# Patient Record
Sex: MALE | Race: OTHER | HISPANIC OR LATINO | URBAN - METROPOLITAN AREA
[De-identification: names, ages, dates, MRNs, and addresses within clinical notes are randomized per-mention and may not be internally consistent; named-entity substitution may affect disease eponyms.]

---

## 2018-08-09 ENCOUNTER — EMERGENCY (EMERGENCY)
Facility: HOSPITAL | Age: 28
LOS: 1 days | Discharge: HOME | End: 2018-08-09
Attending: EMERGENCY MEDICINE | Admitting: EMERGENCY MEDICINE
Payer: SELF-PAY

## 2018-08-09 VITALS
OXYGEN SATURATION: 98 % | HEART RATE: 107 BPM | TEMPERATURE: 100 F | RESPIRATION RATE: 16 BRPM | SYSTOLIC BLOOD PRESSURE: 132 MMHG | DIASTOLIC BLOOD PRESSURE: 92 MMHG

## 2018-08-09 DIAGNOSIS — E66.01 MORBID (SEVERE) OBESITY DUE TO EXCESS CALORIES: ICD-10-CM

## 2018-08-09 DIAGNOSIS — E11.9 TYPE 2 DIABETES MELLITUS WITHOUT COMPLICATIONS: ICD-10-CM

## 2018-08-09 LAB
ALBUMIN SERPL ELPH-MCNC: 4.3 G/DL — SIGNIFICANT CHANGE UP (ref 3.3–5)
ALP SERPL-CCNC: 116 U/L — SIGNIFICANT CHANGE UP (ref 40–120)
ALT FLD-CCNC: 89 U/L — HIGH (ref 4–41)
AST SERPL-CCNC: 76 U/L — HIGH (ref 4–40)
BASE EXCESS BLDV CALC-SCNC: 0.9 MMOL/L — SIGNIFICANT CHANGE UP
BASOPHILS # BLD AUTO: 0.09 K/UL — SIGNIFICANT CHANGE UP (ref 0–0.2)
BASOPHILS NFR BLD AUTO: 1.4 % — SIGNIFICANT CHANGE UP (ref 0–2)
BILIRUB SERPL-MCNC: 0.4 MG/DL — SIGNIFICANT CHANGE UP (ref 0.2–1.2)
BLOOD GAS VENOUS - CREATININE: 0.91 MG/DL — SIGNIFICANT CHANGE UP (ref 0.5–1.3)
BUN SERPL-MCNC: 9 MG/DL — SIGNIFICANT CHANGE UP (ref 7–23)
CALCIUM SERPL-MCNC: 9.3 MG/DL — SIGNIFICANT CHANGE UP (ref 8.4–10.5)
CHLORIDE BLDV-SCNC: 97 MMOL/L — SIGNIFICANT CHANGE UP (ref 96–108)
CHLORIDE SERPL-SCNC: 91 MMOL/L — LOW (ref 98–107)
CO2 SERPL-SCNC: 20 MMOL/L — LOW (ref 22–31)
CREAT SERPL-MCNC: 0.83 MG/DL — SIGNIFICANT CHANGE UP (ref 0.5–1.3)
EOSINOPHIL # BLD AUTO: 0.08 K/UL — SIGNIFICANT CHANGE UP (ref 0–0.5)
EOSINOPHIL NFR BLD AUTO: 1.2 % — SIGNIFICANT CHANGE UP (ref 0–6)
GAS PNL BLDV: 127 MMOL/L — LOW (ref 136–146)
GLUCOSE BLDV-MCNC: 379 — HIGH (ref 70–99)
GLUCOSE SERPL-MCNC: 349 MG/DL — HIGH (ref 70–99)
HBA1C BLD-MCNC: 12.8 % — HIGH (ref 4–5.6)
HCO3 BLDV-SCNC: 23 MMOL/L — SIGNIFICANT CHANGE UP (ref 20–27)
HCT VFR BLD CALC: 46.7 % — SIGNIFICANT CHANGE UP (ref 39–50)
HCT VFR BLDV CALC: 49.8 % — SIGNIFICANT CHANGE UP (ref 39–51)
HGB BLD-MCNC: 15.4 G/DL — SIGNIFICANT CHANGE UP (ref 13–17)
HGB BLDV-MCNC: 16.3 G/DL — SIGNIFICANT CHANGE UP (ref 13–17)
IMM GRANULOCYTES # BLD AUTO: 0.02 # — SIGNIFICANT CHANGE UP
IMM GRANULOCYTES NFR BLD AUTO: 0.3 % — SIGNIFICANT CHANGE UP (ref 0–1.5)
LACTATE BLDV-MCNC: 1.5 MMOL/L — SIGNIFICANT CHANGE UP (ref 0.5–2)
LIDOCAIN IGE QN: 23 U/L — SIGNIFICANT CHANGE UP (ref 7–60)
LYMPHOCYTES # BLD AUTO: 0.76 K/UL — LOW (ref 1–3.3)
LYMPHOCYTES # BLD AUTO: 11.5 % — LOW (ref 13–44)
MCHC RBC-ENTMCNC: 28.4 PG — SIGNIFICANT CHANGE UP (ref 27–34)
MCHC RBC-ENTMCNC: 33 % — SIGNIFICANT CHANGE UP (ref 32–36)
MCV RBC AUTO: 86.2 FL — SIGNIFICANT CHANGE UP (ref 80–100)
MONOCYTES # BLD AUTO: 0.94 K/UL — HIGH (ref 0–0.9)
MONOCYTES NFR BLD AUTO: 14.2 % — HIGH (ref 2–14)
NEUTROPHILS # BLD AUTO: 4.72 K/UL — SIGNIFICANT CHANGE UP (ref 1.8–7.4)
NEUTROPHILS NFR BLD AUTO: 71.4 % — SIGNIFICANT CHANGE UP (ref 43–77)
NRBC # FLD: 0 — SIGNIFICANT CHANGE UP
PCO2 BLDV: 46 MMHG — SIGNIFICANT CHANGE UP (ref 41–51)
PH BLDV: 7.37 PH — SIGNIFICANT CHANGE UP (ref 7.32–7.43)
PLATELET # BLD AUTO: 256 K/UL — SIGNIFICANT CHANGE UP (ref 150–400)
PMV BLD: 10 FL — SIGNIFICANT CHANGE UP (ref 7–13)
PO2 BLDV: 25 MMHG — LOW (ref 35–40)
POTASSIUM BLDV-SCNC: 4.2 MMOL/L — SIGNIFICANT CHANGE UP (ref 3.4–4.5)
POTASSIUM SERPL-MCNC: 4.2 MMOL/L — SIGNIFICANT CHANGE UP (ref 3.5–5.3)
POTASSIUM SERPL-SCNC: 4.2 MMOL/L — SIGNIFICANT CHANGE UP (ref 3.5–5.3)
PROT SERPL-MCNC: 7.7 G/DL — SIGNIFICANT CHANGE UP (ref 6–8.3)
RBC # BLD: 5.42 M/UL — SIGNIFICANT CHANGE UP (ref 4.2–5.8)
RBC # FLD: 12.9 % — SIGNIFICANT CHANGE UP (ref 10.3–14.5)
SAO2 % BLDV: 44.3 % — LOW (ref 60–85)
SODIUM SERPL-SCNC: 128 MMOL/L — LOW (ref 135–145)
TROPONIN T, HIGH SENSITIVITY: 7 NG/L — SIGNIFICANT CHANGE UP (ref ?–14)
TROPONIN T, HIGH SENSITIVITY: 8 NG/L — SIGNIFICANT CHANGE UP (ref ?–14)
WBC # BLD: 6.61 K/UL — SIGNIFICANT CHANGE UP (ref 3.8–10.5)
WBC # FLD AUTO: 6.61 K/UL — SIGNIFICANT CHANGE UP (ref 3.8–10.5)

## 2018-08-09 PROCEDURE — 71046 X-RAY EXAM CHEST 2 VIEWS: CPT | Mod: 26

## 2018-08-09 PROCEDURE — 93306 TTE W/DOPPLER COMPLETE: CPT | Mod: 26

## 2018-08-09 PROCEDURE — 99218: CPT | Mod: 25

## 2018-08-09 PROCEDURE — 99245 OFF/OP CONSLTJ NEW/EST HI 55: CPT

## 2018-08-09 RX ORDER — INSULIN LISPRO 100/ML
VIAL (ML) SUBCUTANEOUS
Qty: 0 | Refills: 0 | Status: DISCONTINUED | OUTPATIENT
Start: 2018-08-09 | End: 2018-08-13

## 2018-08-09 RX ORDER — DEXTROSE 50 % IN WATER 50 %
25 SYRINGE (ML) INTRAVENOUS ONCE
Qty: 0 | Refills: 0 | Status: DISCONTINUED | OUTPATIENT
Start: 2018-08-09 | End: 2018-08-13

## 2018-08-09 RX ORDER — DEXTROSE 50 % IN WATER 50 %
12.5 SYRINGE (ML) INTRAVENOUS ONCE
Qty: 0 | Refills: 0 | Status: DISCONTINUED | OUTPATIENT
Start: 2018-08-09 | End: 2018-08-13

## 2018-08-09 RX ORDER — KETOROLAC TROMETHAMINE 30 MG/ML
15 SYRINGE (ML) INJECTION ONCE
Qty: 0 | Refills: 0 | Status: DISCONTINUED | OUTPATIENT
Start: 2018-08-09 | End: 2018-08-09

## 2018-08-09 RX ORDER — ACETAMINOPHEN 500 MG
650 TABLET ORAL ONCE
Qty: 0 | Refills: 0 | Status: COMPLETED | OUTPATIENT
Start: 2018-08-09 | End: 2018-08-09

## 2018-08-09 RX ORDER — SODIUM CHLORIDE 9 MG/ML
1000 INJECTION, SOLUTION INTRAVENOUS
Qty: 0 | Refills: 0 | Status: DISCONTINUED | OUTPATIENT
Start: 2018-08-09 | End: 2018-08-13

## 2018-08-09 RX ORDER — SODIUM CHLORIDE 9 MG/ML
2000 INJECTION INTRAMUSCULAR; INTRAVENOUS; SUBCUTANEOUS ONCE
Qty: 0 | Refills: 0 | Status: COMPLETED | OUTPATIENT
Start: 2018-08-09 | End: 2018-08-09

## 2018-08-09 RX ORDER — GLUCAGON INJECTION, SOLUTION 0.5 MG/.1ML
1 INJECTION, SOLUTION SUBCUTANEOUS ONCE
Qty: 0 | Refills: 0 | Status: DISCONTINUED | OUTPATIENT
Start: 2018-08-09 | End: 2018-08-13

## 2018-08-09 RX ORDER — INSULIN GLARGINE 100 [IU]/ML
20 INJECTION, SOLUTION SUBCUTANEOUS AT BEDTIME
Qty: 0 | Refills: 0 | Status: DISCONTINUED | OUTPATIENT
Start: 2018-08-09 | End: 2018-08-10

## 2018-08-09 RX ORDER — INSULIN LISPRO 100/ML
7 VIAL (ML) SUBCUTANEOUS
Qty: 0 | Refills: 0 | Status: DISCONTINUED | OUTPATIENT
Start: 2018-08-09 | End: 2018-08-10

## 2018-08-09 RX ORDER — DEXTROSE 50 % IN WATER 50 %
15 SYRINGE (ML) INTRAVENOUS ONCE
Qty: 0 | Refills: 0 | Status: DISCONTINUED | OUTPATIENT
Start: 2018-08-09 | End: 2018-08-13

## 2018-08-09 RX ORDER — FAMOTIDINE 10 MG/ML
20 INJECTION INTRAVENOUS ONCE
Qty: 0 | Refills: 0 | Status: COMPLETED | OUTPATIENT
Start: 2018-08-09 | End: 2018-08-09

## 2018-08-09 RX ADMIN — Medication 15 MILLIGRAM(S): at 11:35

## 2018-08-09 RX ADMIN — INSULIN GLARGINE 20 UNIT(S): 100 INJECTION, SOLUTION SUBCUTANEOUS at 22:44

## 2018-08-09 RX ADMIN — SODIUM CHLORIDE 2000 MILLILITER(S): 9 INJECTION INTRAMUSCULAR; INTRAVENOUS; SUBCUTANEOUS at 10:30

## 2018-08-09 RX ADMIN — Medication 15 MILLIGRAM(S): at 12:32

## 2018-08-09 RX ADMIN — SODIUM CHLORIDE 2000 MILLILITER(S): 9 INJECTION INTRAMUSCULAR; INTRAVENOUS; SUBCUTANEOUS at 12:30

## 2018-08-09 RX ADMIN — FAMOTIDINE 20 MILLIGRAM(S): 10 INJECTION INTRAVENOUS at 10:30

## 2018-08-09 RX ADMIN — Medication 30 MILLILITER(S): at 10:30

## 2018-08-09 RX ADMIN — Medication 5: at 18:49

## 2018-08-09 RX ADMIN — Medication 650 MILLIGRAM(S): at 10:30

## 2018-08-09 RX ADMIN — Medication 7 UNIT(S): at 18:47

## 2018-08-09 NOTE — ED CDU PROVIDER INITIAL DAY NOTE - ATTENDING CONTRIBUTION TO CARE
19M with 1d of myalgias and fever also with burning chest pain, intermittent, no SOB.  In ED EKG w [place] depressions, no ST changes. Labs with hyperglycemia, no prior h/o DM.  Serial trop negative. CXR clear. Bedside u/s negative for effusion. Symptoms improved with IVF and NSAIDS. On exam nad, well appearing, mmm, lungs clear, rrr, abd soft, 2+ pulses, no edema, no rash, no focal neuro deficits.  Placed in CDU for new DM. Likely viral syndrome, consider mild pericarditis, will continue nsaids.

## 2018-08-09 NOTE — CONSULT NOTE ADULT - ASSESSMENT
29 yo M with history of well controlled asthma, presenting with generalized pain, found to have newly diagnosed uncontrolled Type 2 DM.

## 2018-08-09 NOTE — ED ADULT NURSE NOTE - OBJECTIVE STATEMENT
received pt A&Ox3 in no apparent distress at this time. #20g IVL to L AC, bloods drawn and sent to the lab. no s/s of infiltration noted at this time. family and MD at bedside. vss. cardiac monitor in place. medicated as per MD order. dispo pending.

## 2018-08-09 NOTE — CONSULT NOTE ADULT - PROBLEM SELECTOR RECOMMENDATION 9
Newly diagnosed type 2 DM, uncontrolled due to poor, extremely high carb diet as well as lack of any medical care for a long time.  Discussed with patient that he will need to do once daily insulin with a oral regimen which he is in agreement with.   -Recommend to start Lantus 20 units at bedtime  -Recommend to start Humalog 7 units tid with meals  -Recommend to start Humalog LDSS tid with meals and at bedtime  -On discharge, recommend to start basal insulin, does to be determined ( likely between 20-25 units daily), can prescribe either Lantus, Basaglar, Levemir, Toujeo or Trseiba.   -On discharge, start Metformin 500mg BID and up-titrate to 1000mg as tolerated.   -Please prescribe insulin pen, pen needles, glucometer, glucose testing strips, Lancets, and alcohol pads prior to discharge.   -If patient wishes to follow up with [facility]     [facility]  [address] [place] [address], [facility] [address], [place] [address] [phone]   Please call to schedule appointment with CDE/Nutritionist and MD appointment next available  --------------------------------------------------------------------------------------  [facility]  [address] [place] [address], [address] [place] [phone]    Please page [phone] with [phone] digit call back number if any questions.    If no response, please call [phone] or page fellow on call, patient will be followed by another endocrinology provider tomorrow AM.

## 2018-08-09 NOTE — ED PROVIDER NOTE - PROGRESS NOTE DETAILS
Freddie YEVGENIY: No pericardial effusion noted on POCUS. Pt stating he has had polyuria/polydipsia recently, informed him about diabetes. Has agreed to CDU stay for diabetes education and echocardiogram.

## 2018-08-09 NOTE — ED PROVIDER NOTE - MEDICAL DECISION MAKING DETAILS
Pt with fever, bodyaches and chest discomfort. [place] depressions on ekg. Concern for early viral pericarditis vs PNA vs viral illness  - labs, echo, IVF, CXR, ECG, tylenol, toradol Nikky: Pt with fever, bodyaches and chest discomfort. [place] depressions on ekg. Concern for early viral pericarditis vs PNA vs viral illness  - labs, echo, IVF, CXR, ECG, tylenol, toradol  -labs reveal new onset DM - will need CDU for diabetes education, echo and blood glucose stablization

## 2018-08-09 NOTE — ED ADULT TRIAGE NOTE - CHIEF COMPLAINT QUOTE
arrives from [place] 1 week ago, comes to ED for c/o chest discomfort described as a burning sensation, subjective fever, and generalized body aches since yesterday. PMH: asthma

## 2018-08-09 NOTE — ED ADULT NURSE NOTE - NSIMPLEMENTINTERV_GEN_ALL_ED
Implemented All Universal Safety Interventions:  [place] to call system. Call bell, personal items and telephone within reach. Instruct patient to call for assistance. Room bathroom lighting operational. Non-slip footwear when patient is off stretcher. Physically safe environment: no spills, clutter or unnecessary equipment. Stretcher in lowest position, wheels locked, appropriate side rails in place.

## 2018-08-09 NOTE — ED CDU PROVIDER INITIAL DAY NOTE - MEDICAL DECISION MAKING DETAILS
PT had normal echo. He will be observed overnight for new onset dm and will be seen by endo tomorrow PT had normal bedside echo. He will be observed overnight for new onset dm and will be seen by endo tomorrow

## 2018-08-09 NOTE — CONSULT NOTE ADULT - SUBJECTIVE AND OBJECTIVE BOX
HPI:      PAST MEDICAL & SURGICAL HISTORY:  Asthma      FAMILY HISTORY:      Social History:    Outpatient Medications:      MEDICATIONS  (STANDING):    MEDICATIONS  (PRN):      Allergies    penicillin (Unknown)    Intolerances      Review of Systems:  Constitutional: No fever  Eyes: No blurry vision  Neuro: No tremors  HEENT: No pain  Cardiovascular: No chest pain, palpitations  Respiratory: No SOB, no cough  GI: No nausea, vomiting, abdominal pain  : No dysuria  Skin: no rash  Psych: no depression  Endocrine: no polyuria, polydipsia  Hem/lymph: no swelling  Osteoporosis: no fractures    ALL OTHER SYSTEMS REVIEWED AND NEGATIVE    UNABLE TO OBTAIN    PHYSICAL EXAM:  -----------------------------  VITALS: T(C): 36.9 (08-09-18 @ 12:25)  T(F): 98.4 (08-09-18 @ 12:25), Max: 101.3 (08-09-18 @ 09:57)  HR: 86 (08-09-18 @ 12:25) (86 - 107)  BP: 114/73 (08-09-18 @ 12:25) (114/73 - 134/79)  RR:  (16 - 17)  SpO2:  (98% - 100%)  Wt(kg): --  GENERAL: NAD, well-groomed, well-developed  EYES: No proptosis, no lid lag, anicteric  HEENT:  Atraumatic, Normocephalic, moist mucous membranes  THYROID: Normal size, no palpable nodules  RESPIRATORY: Clear to auscultation bilaterally; No rales, rhonchi, wheezing, or rubs  CARDIOVASCULAR: Regular rate and rhythm; No murmurs; no peripheral edema  GI: Soft, nontender, non distended, normal bowel sounds  SKIN: Dry, intact, No rashes or lesions  MUSCULOSKELETAL: Full range of motion, normal strength  NEURO: sensation intact, extraocular movements intact, no tremor, normal reflexes  PSYCH: Alert and oriented x 3, normal affect, normal mood  CUSHING'S SIGNS: no striae    POCT Blood Glucose.: 313 mg/dL (08-09-18 @ 12:58)                            15.4   6.61  )-----------( 256      ( 09 Aug 2018 10:10 )             46.7       08-09    128<L>  |  91<L>  |  9   ----------------------------<  349<H>  4.2   |  20<L>  |  0.83    EGFR if : 137  EGFR if non : 118    Ca    9.3      08-09    TPro  7.7  /  Alb  4.3  /  TBili  0.4  /  DBili  x   /  AST  76<H>  /  ALT  89<H>  /  AlkPhos  116  08-09      Thyroid Function Tests:      Hemoglobin A1C, Whole Blood: 12.8 % <H> [4.0 - 5.6] (08-09-18 @ 09:51)          Radiology:   ------------------------      < from: Xray Chest 2 Views PA/Lat (08.09.18 @ 10:28) >      INTERPRETATION:  CLINICAL INFORMATION: Chest pain  IMPRESSION: Normal chest    < end of copied text > HPI: HANNAH MARISCAL is a 27 yo M with history of mild intermittent asthma, well controlled, has not been on medication since teenager, current active smoker, obesity presenting to emergency room with back pain.  He was found to have possible pericarditis and pending further workup with echocardiogram.  He was noted to have elevated glucose of 300s mg/dl in blood chemistry as well as an elevated A1C of 12.8%  Patient reported a strong family history of type 2 DM, both his parents have diabetes mellitus.  He reports that his diet is poor.  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  He admits to eating fast food, Chinese food, drinking regular soda as well as juices.  He endorsed polydipsia, polyuria.  He denies blurry vision over the last 1 month.   He reports no modifying factors.       PAST MEDICAL & SURGICAL HISTORY:  Asthma      FAMILY HISTORY:  Mother - Breast cancer,   Father - Type 2 DM       Social History:  Current every day smoker  Social alcohol  No Drugs  Employed   , his wife is currently pregnant.     Outpatient Medications:  NONE    MEDICATIONS  (STANDING):    MEDICATIONS  (PRN):      Allergies    penicillin (Unknown)    Review of Systems:  Constitutional: No fever  Eyes: No blurry vision  Neuro: No tremors  HEENT: No pain  Cardiovascular: No chest pain, palpitations  Respiratory: No SOB, no cough  GI: No nausea, vomiting, abdominal pain  : No dysuria  Skin: no rash  Psych: no depression  Endocrine: +polyuria, polydipsia  Hem/lymph: no swelling  Osteoporosis: no fractures    ALL OTHER SYSTEMS REVIEWED AND NEGATIVE    UNABLE TO OBTAIN    PHYSICAL EXAM:  -----------------------------  VITALS: T(C): 36.9 (18 @ 12:25)  T(F): 98.4 (18 @ 12:25), Max: 101.3 (18 @ 09:57)  HR: 86 (18 @ 12:25) (86 - 107)  BP: 114/73 (18 @ 12:25) (114/73 - 134/79)  RR:  (16 - 17)  SpO2:  (98% - 100%)  Wt(kg): --  GENERAL: NAD, well-groomed, well-developed  EYES: No proptosis, no lid lag, anicteric  HEENT:  Atraumatic, Normocephalic, moist mucous membranes  THYROID: Normal size, no palpable nodules  RESPIRATORY: Clear to auscultation bilaterally; No rales, rhonchi, wheezing, or rubs  CARDIOVASCULAR: Regular rate and rhythm; No murmurs; no peripheral edema  GI: Soft, nontender, non distended, normal bowel sounds  SKIN: Dry, intact, No rashes or lesions  MUSCULOSKELETAL: Full range of motion, normal strength  NEURO: sensation intact, extraocular movements intact, no tremor, normal reflexes  PSYCH: Alert and oriented x 3, normal affect, normal mood  CUSHING'S SIGNS: no striae    POCT Blood Glucose.: 313 mg/dL (18 @ 12:58)                        15.4   6.61  )-----------( 256      ( 09 Aug 2018 10:10 )             46.7           128<L>  |  91<L>  |  9   ----------------------------<  349<H>  4.2   |  20<L>  |  0.83    EGFR if : 137  EGFR if non : 118    Ca    9.3          TPro  7.7  /  Alb  4.3  /  TBili  0.4  /  DBili  x   /  AST  76<H>  /  ALT  89<H>  /  AlkPhos  116        Thyroid Function Tests:      Hemoglobin A1C, Whole Blood: 12.8 % <H> [4.0 - 5.6] (18 @ 09:51)          Radiology:   ------------------------      < from: Xray Chest 2 Views PA/Lat (18 @ 10:28) >      INTERPRETATION:  CLINICAL INFORMATION: Chest pain  IMPRESSION: Normal chest    < end of copied text >

## 2018-08-09 NOTE — SBIRT NOTE. - NSSBIRTSERVICES_GEN_A_ED_FT
Discussed pt's tobacco use and motivation to reduce/stop.  Provided pt with resources for smoking cessation.

## 2018-08-09 NOTE — ED ADULT NURSE NOTE - CHIEF COMPLAINT QUOTE
arrives from [place] 1 week ago, comes to ED for c/o chest discomfort described as a burning sensation, subjective fever, and generalized body aches since yesterday. PMH: asthma

## 2018-08-09 NOTE — ED PROCEDURE NOTE - PROCEDURE ADDITIONAL DETAILS
Focused ED Transthoracic echo 82985 - indication (  pericarditis eval for effusion    )    Grey scale imaging obtained in four views ( parasternal long, parasternal short, apical and subxiphoid)    No pericardial effusion noted.  No evidence of cardiac tamponade  LV contractility - normal.  RV normal size.    Impression: no pericardial effusion, normal contractility, RV normal size.  Dexeus.

## 2018-08-09 NOTE — ED CDU PROVIDER INITIAL DAY NOTE - OBJECTIVE STATEMENT
27 y/o male h/o asthma, last attack as teenager, c/o fever and body aches started yesterday also had some burning in his chest that resolved. Pt found to have high blood sugar and sent to CDU for observation and diabetic teaching. He was seen by endo and put on insulin regimen. Normal Echo.

## 2018-08-09 NOTE — ED PROVIDER NOTE - OBJECTIVE STATEMENT
28M h/o asthma p/w bodyaches and subjective fever since yesterday, today noticed burning chest discomfort which improves with water. Has had the chest discomfort before and varies based on what he eats. Returned from the DR 7/30/18. CP is non-positional. Denies rash, neck pain, urinary symptoms, n/v/d, cough, congestion, sick contacts, recent illness. Was in usual state of health prior to 2 days ago. 28M h/o asthma p/w body aches and subjective fever since yesterday, today noticed burning chest discomfort which improves with water. Has had the chest discomfort before and varies based on what he eats. Returned from the DR 7/30/18. CP is non-positional. Denies rash, neck pain, urinary symptoms, n/v/d, cough, congestion, sick contacts, recent illness. Was in usual state of health prior to 2 days ago.

## 2018-08-10 VITALS
HEART RATE: 88 BPM | TEMPERATURE: 99 F | DIASTOLIC BLOOD PRESSURE: 85 MMHG | SYSTOLIC BLOOD PRESSURE: 136 MMHG | OXYGEN SATURATION: 100 % | RESPIRATION RATE: 16 BRPM

## 2018-08-10 PROBLEM — Z00.00 ENCOUNTER FOR PREVENTIVE HEALTH EXAMINATION: Status: ACTIVE | Noted: 2018-08-10

## 2018-08-10 LAB
ALBUMIN SERPL ELPH-MCNC: 3.9 G/DL — SIGNIFICANT CHANGE UP (ref 3.3–5)
ALP SERPL-CCNC: 102 U/L — SIGNIFICANT CHANGE UP (ref 40–120)
ALT FLD-CCNC: 88 U/L — HIGH (ref 4–41)
AST SERPL-CCNC: 72 U/L — HIGH (ref 4–40)
BASE EXCESS BLDV CALC-SCNC: -0.8 MMOL/L — SIGNIFICANT CHANGE UP
BILIRUB SERPL-MCNC: 0.3 MG/DL — SIGNIFICANT CHANGE UP (ref 0.2–1.2)
BLOOD GAS VENOUS - CREATININE: 0.54 MG/DL — SIGNIFICANT CHANGE UP (ref 0.5–1.3)
BUN SERPL-MCNC: 9 MG/DL — SIGNIFICANT CHANGE UP (ref 7–23)
CALCIUM SERPL-MCNC: 8.9 MG/DL — SIGNIFICANT CHANGE UP (ref 8.4–10.5)
CHLORIDE BLDV-SCNC: 103 MMOL/L — SIGNIFICANT CHANGE UP (ref 96–108)
CHLORIDE SERPL-SCNC: 96 MMOL/L — LOW (ref 98–107)
CO2 SERPL-SCNC: 22 MMOL/L — SIGNIFICANT CHANGE UP (ref 22–31)
CREAT SERPL-MCNC: 0.65 MG/DL — SIGNIFICANT CHANGE UP (ref 0.5–1.3)
GAS PNL BLDV: 127 MMOL/L — LOW (ref 136–146)
GLUCOSE BLDV-MCNC: 260 — HIGH (ref 70–99)
GLUCOSE SERPL-MCNC: 264 MG/DL — HIGH (ref 70–99)
HCO3 BLDV-SCNC: 23 MMOL/L — SIGNIFICANT CHANGE UP (ref 20–27)
HCT VFR BLDV CALC: 48 % — SIGNIFICANT CHANGE UP (ref 39–51)
HGB BLDV-MCNC: 15.7 G/DL — SIGNIFICANT CHANGE UP (ref 13–17)
LACTATE BLDV-MCNC: 1.2 MMOL/L — SIGNIFICANT CHANGE UP (ref 0.5–2)
PCO2 BLDV: 45 MMHG — SIGNIFICANT CHANGE UP (ref 41–51)
PH BLDV: 7.35 PH — SIGNIFICANT CHANGE UP (ref 7.32–7.43)
PO2 BLDV: 43 MMHG — HIGH (ref 35–40)
POTASSIUM BLDV-SCNC: 4.2 MMOL/L — SIGNIFICANT CHANGE UP (ref 3.4–4.5)
POTASSIUM SERPL-MCNC: 4 MMOL/L — SIGNIFICANT CHANGE UP (ref 3.5–5.3)
POTASSIUM SERPL-SCNC: 4 MMOL/L — SIGNIFICANT CHANGE UP (ref 3.5–5.3)
PROT SERPL-MCNC: 7.3 G/DL — SIGNIFICANT CHANGE UP (ref 6–8.3)
SAO2 % BLDV: 76.1 % — SIGNIFICANT CHANGE UP (ref 60–85)
SODIUM SERPL-SCNC: 131 MMOL/L — LOW (ref 135–145)
SPECIMEN SOURCE: SIGNIFICANT CHANGE UP
SPECIMEN SOURCE: SIGNIFICANT CHANGE UP

## 2018-08-10 PROCEDURE — 99217: CPT

## 2018-08-10 PROCEDURE — 99232 SBSQ HOSP IP/OBS MODERATE 35: CPT

## 2018-08-10 RX ORDER — ISOPROPYL ALCOHOL, BENZOCAINE .7; .06 ML/ML; ML/ML
1 SWAB TOPICAL
Qty: 1 | Refills: 0 | OUTPATIENT
Start: 2018-08-10

## 2018-08-10 RX ORDER — INSULIN LISPRO 100/ML
9 VIAL (ML) SUBCUTANEOUS
Qty: 0 | Refills: 0 | Status: DISCONTINUED | OUTPATIENT
Start: 2018-08-10 | End: 2018-08-10

## 2018-08-10 RX ORDER — ENOXAPARIN SODIUM 100 MG/ML
24 INJECTION SUBCUTANEOUS
Qty: 5 | Refills: 0 | OUTPATIENT
Start: 2018-08-10

## 2018-08-10 RX ORDER — INSULIN LISPRO 100/ML
9 VIAL (ML) SUBCUTANEOUS
Qty: 0 | Refills: 0 | Status: DISCONTINUED | OUTPATIENT
Start: 2018-08-10 | End: 2018-08-13

## 2018-08-10 RX ORDER — INSULIN GLARGINE 100 [IU]/ML
24 INJECTION, SOLUTION SUBCUTANEOUS AT BEDTIME
Qty: 0 | Refills: 0 | Status: DISCONTINUED | OUTPATIENT
Start: 2018-08-10 | End: 2018-08-13

## 2018-08-10 RX ORDER — METFORMIN HYDROCHLORIDE 850 MG/1
1 TABLET ORAL
Qty: 98 | Refills: 0 | OUTPATIENT
Start: 2018-08-10

## 2018-08-10 RX ADMIN — Medication 9 UNIT(S): at 13:19

## 2018-08-10 RX ADMIN — Medication 2: at 13:18

## 2018-08-10 RX ADMIN — Medication 7 UNIT(S): at 09:22

## 2018-08-10 RX ADMIN — Medication 2: at 09:22

## 2018-08-10 NOTE — ED CDU PROVIDER SUBSEQUENT DAY NOTE - PROGRESS NOTE DETAILS
Cabot: Patient signed out to me.  28M who comes in with 1 month of polydipsia and polyuria, as well as 1 day of CP and body aches.  Found to have new onset DM.  Hgb Alc 12.8.  Trop 8-->7, CXR neg.  Not in DKA.  Endocrine evaluated and started on insulin.  On exam, HDS, NAD, lungs CTAB, heart sounds normal, abd benign, LEs without edema.  Pending repeat labs today and endocrine reeval. Emerson Harris: pt seen and evaluated by Endocrinology- pt had extensive teaching with CDU staff, diabetes education and pharm regarding his new diagnosis and how to use lantus. pt feels comfortable with self injection. Has a scheduled alvarez on aug 22nd. Pt to be discharged on Metformin and Lantus. Return precautions given, ready for dc.

## 2018-08-10 NOTE — ED CDU PROVIDER DISPOSITION NOTE - ATTENDING CONTRIBUTION TO CARE
I, Jennifer Cabot, MD, have performed a history and physical exam of the patient and discussed their management with the ACP.  I reviewed the PA's note and agree with the documented findings and plan of care. My medical decision making and observations are found above.    Cabot: 28M who comes in with 1 month of polydipsia and polyuria, as well as 1 day of CP and body aches.  Found to have new onset DM.  Hgb Alc 12.8.  Trop 8-->7, CXR neg.  Not in DKA.  Endocrine evaluated and started on insulin.  They reevaluated today and decided to discharge him on lantus and metformin.  He has received teaching and is comfortable with the plan.  He will follow up in the clinic on Aug 22.  On exam, HDS, NAD, lungs CTAB, heart sounds normal, abd benign, LEs without edema.  He is safe for discharge.

## 2018-08-10 NOTE — ED CDU PROVIDER SUBSEQUENT DAY NOTE - ATTENDING CONTRIBUTION TO CARE
I, Jennifer Cabot, MD, have performed a history and physical exam of the patient and discussed their management with the ACP.  I reviewed the PA's note and agree with the documented findings and plan of care. My medical decision making and observations are found above.    Cabot: 28M who comes in with 1 month of polydipsia and polyuria, as well as 1 day of CP and body aches.  Found to have new onset DM.  Hgb Alc 12.8.  Trop 8-->7, CXR neg.  Not in DKA.  Endocrine evaluated and started on insulin.  On exam, HDS, NAD, lungs CTAB, heart sounds normal, abd benign, LEs without edema.  Pending repeat labs today and endocrine reeval.

## 2018-08-10 NOTE — PROGRESS NOTE ADULT - SUBJECTIVE AND OBJECTIVE BOX
Chief Complaint: DM 2    History: Patient with newly diagnosed DM2, uninsured, needs insulin for discharge as A1c is above 12%, so affordable insulin plan needed. Fasting FS mid 200s, more basal insulin needed. Tolerating PO, c/o of blurry vision, increased thirst, hunger, urination.     MEDICATIONS  (STANDING):  dextrose 5%. 1000 milliLiter(s) (50 mL/Hr) IV Continuous <Continuous>  dextrose 50% Injectable 12.5 Gram(s) IV Push once  dextrose 50% Injectable 25 Gram(s) IV Push once  dextrose 50% Injectable 25 Gram(s) IV Push once  insulin glargine Injectable (LANTUS) 20 Unit(s) SubCutaneous at bedtime  insulin lispro (HumaLOG) corrective regimen sliding scale   SubCutaneous three times a day before meals  insulin lispro Injectable (HumaLOG) 7 Unit(s) SubCutaneous three times a day before meals    MEDICATIONS  (PRN):  dextrose 40% Gel 15 Gram(s) Oral once PRN Blood Glucose LESS THAN 70 milliGRAM(s)/deciliter  glucagon  Injectable 1 milliGRAM(s) IntraMuscular once PRN Glucose LESS THAN 70 milligrams/deciliter      Allergies    penicillin (Unknown)        Review of Systems:  Constitutional: No fever  Eyes: c/o blurry vision  Neuro: No tremors  HEENT: No pain  Cardiovascular: No chest pain, palpitations  Respiratory: No SOB, no cough  GI: No nausea, vomiting, abdominal pain  Endocrine: c/o polyuria, polydipsia        PHYSICAL EXAM:  VITALS: T(C): 36.9 (08-10-18 @ 10:00)  T(F): 98.4 (08-10-18 @ 10:00), Max: 99.3 (08-09-18 @ 18:30)  HR: 89 (08-10-18 @ 10:00) (60 - 89)  BP: 131/89 (08-10-18 @ 10:00) (114/73 - 158/71)  RR:  (14 - 17)  SpO2:  (98% - 100%)  Wt(kg): --  GENERAL: NAD, well-groomed  EYES: No proptosis, no lid lag, anicteric  HEENT:  Atraumatic, Normocephalic, moist mucous membranes  RESPIRATORY: Non labored  GI: Soft, nontender, non distended, obese  SKIN: Dry, warm  MUSCULOSKELETAL: Full range of motion, normal strength  NEURO: extraocular movements intact, no tremor  PSYCH: Alert and oriented x 3, normal affect, normal mood      POCT Blood Glucose.: 249 mg/dL (08-10-18 @ 09:13)  POCT Blood Glucose.: 371 mg/dL (08-09-18 @ 22:38)  POCT Blood Glucose.: 371 mg/dL (08-09-18 @ 17:33)  POCT Blood Glucose.: 313 mg/dL (08-09-18 @ 12:58)      08-10    131<L>  |  96<L>  |  9   ----------------------------<  264<H>  4.0   |  22  |  0.65    EGFR if : 151  EGFR if non : 131    Ca    8.9      08-10    TPro  7.3  /  Alb  3.9  /  TBili  0.3  /  DBili  x   /  AST  72<H>  /  ALT  88<H>  /  AlkPhos  102  08-10            Hemoglobin A1C, Whole Blood: 12.8 % <H> [4.0 - 5.6] (08-09-18 @ 09:51)      Case discussed with: CDU staff, pharmacy liaison, endocrine attending ERYN Garcia MD

## 2018-08-10 NOTE — ED CDU PROVIDER SUBSEQUENT DAY NOTE - HISTORY
CDU Initial Day Note: 27 y/o male h/o asthma, last attack as teenager, c/o fever and body aches started yesterday also had some burning in his chest that resolved. Pt found to have high blood sugar and sent to CDU for observation and diabetic teaching. He was seen by endo and put on insulin regimen. Normal Echo.  CDU Subsequent Day Note: 29yo M w/ new onset DM, no acute interval changes overnight.

## 2018-08-10 NOTE — ED CDU PROVIDER DISPOSITION NOTE - CLINICAL COURSE
Cabot: 28M who comes in with 1 month of polydipsia and polyuria, as well as 1 day of CP and body aches.  Found to have new onset DM.  Hgb Alc 12.8.  Trop 8-->7, CXR neg.  Not in DKA.  Endocrine evaluated and started on insulin.  They reevaluated today and decided to discharge him on lantus and metformin.  He has received teaching and is comfortable with the plan.  He will follow up in the clinic on Aug 22.  On exam, HDS, NAD, lungs CTAB, heart sounds normal, abd benign, LEs without edema.  He is safe for discharge.

## 2018-08-10 NOTE — PROGRESS NOTE ADULT - PROBLEM SELECTOR PLAN 1
See full consult for complete plan of care    DM 2 follow up  - while inpatient, increased Lantus to 24 units, increased premeal Humalog to 9 units TID before meals, c/w correctional scales as ordered    DC plan:  TBD pending discussion with pharmacy liaison to try to coordinate most affordable insulin plan for patient as patient has no insurance at this time. Plan would be basal insulin plus oral medication vs. mixed insulin given twice daily. Asked CDU staff to start teaching patient vial / syringe, glucometer. Document all education completed in DM CPG as we want to ensure patient is able to see the syringe markings to properly draw up insulin.   Outpatient dilated eye exam is needed  Patient does not have a PCP so he needs scheduled follow up at both medicine and endocrine ACU clinic. Email has been sent to clinic to contact patient to coordinate follow up.     Would consider checking lipid panel as DM is grossly uncontrolled and patient is obese. See below.

## 2018-08-13 DIAGNOSIS — E11.9 TYPE 2 DIABETES MELLITUS W/OUT COMPLICATIONS: ICD-10-CM

## 2018-08-13 RX ORDER — PEN NEEDLE, DIABETIC 29 G X1/2"
32G X 4 MM NEEDLE, DISPOSABLE MISCELLANEOUS
Qty: 1 | Refills: 0 | Status: ACTIVE | COMMUNITY
Start: 2018-08-13

## 2018-08-13 RX ORDER — INSULIN GLARGINE 100 [IU]/ML
100 INJECTION, SOLUTION SUBCUTANEOUS DAILY
Qty: 1 | Refills: 0 | Status: ACTIVE | COMMUNITY
Start: 2018-08-13

## 2018-08-13 RX ORDER — BLOOD SUGAR DIAGNOSTIC
STRIP MISCELLANEOUS
Qty: 100 | Refills: 0 | Status: ACTIVE | COMMUNITY
Start: 2018-08-13

## 2018-08-13 RX ORDER — LANCETS 33 GAUGE
EACH MISCELLANEOUS
Qty: 1 | Refills: 0 | Status: ACTIVE | COMMUNITY
Start: 2018-08-13

## 2018-08-13 RX ORDER — METFORMIN HYDROCHLORIDE 500 MG/1
500 TABLET, COATED ORAL
Qty: 98 | Refills: 0 | Status: ACTIVE | COMMUNITY
Start: 2018-08-13

## 2018-08-14 LAB
BACTERIA BLD CULT: SIGNIFICANT CHANGE UP
BACTERIA BLD CULT: SIGNIFICANT CHANGE UP

## 2018-08-22 ENCOUNTER — APPOINTMENT (OUTPATIENT)
Dept: INTERNAL MEDICINE | Facility: HOSPITAL | Age: 28
End: 2018-08-22

## 2019-04-25 ENCOUNTER — EMERGENCY (EMERGENCY)
Facility: HOSPITAL | Age: 29
LOS: 1 days | Discharge: HOME | End: 2019-04-25
Attending: EMERGENCY MEDICINE
Payer: SELF-PAY

## 2019-04-25 VITALS
WEIGHT: 300.05 LBS | HEART RATE: 82 BPM | OXYGEN SATURATION: 99 % | RESPIRATION RATE: 16 BRPM | SYSTOLIC BLOOD PRESSURE: 133 MMHG | TEMPERATURE: 98 F | DIASTOLIC BLOOD PRESSURE: 85 MMHG

## 2019-04-25 PROBLEM — E11.9 TYPE 2 DIABETES MELLITUS WITHOUT COMPLICATIONS: Chronic | Status: ACTIVE | Noted: 2018-08-10

## 2019-04-25 PROBLEM — J45.909 UNSPECIFIED ASTHMA, UNCOMPLICATED: Chronic | Status: ACTIVE | Noted: 2018-08-09

## 2019-04-25 PROCEDURE — 99283 EMERGENCY DEPT VISIT LOW MDM: CPT

## 2019-04-25 PROCEDURE — 99282 EMERGENCY DEPT VISIT SF MDM: CPT

## 2019-04-25 NOTE — ED PROVIDER NOTE - OBJECTIVE STATEMENT
27 y/o M with PMHx of asthma, DM presents to the ED from work with complaints of headache. Patient states 2 weeks ago while in the [place] developed headache; patient was seen in the ED at which time was told to have elevated blood pressure and discharged with medications. Patient was advised to follow up with PMD, however has not done so as of yet. Patient states today while at work developed headache at approximately 11:00. Patient notes family history of hypertension. Denies any other acute complaints.

## 2019-04-25 NOTE — ED PROVIDER NOTE - DURATION
"DULoxetine (CYMBALTA) 30 MG EC capsule  Last Written Prescription Date:  4/20/18  Last Fill Quantity: 90 capsule,  # refills: 1   Last office visit: 4/20/2018 with prescribing provider:  Trent   Future Office Visit:      Requested Prescriptions   Pending Prescriptions Disp Refills     DULoxetine (CYMBALTA) 60 MG EC capsule [Pharmacy Med Name: DULOXETINE DR 60MG CAPSULES] 90 capsule 0     Sig: TAKE 1 CAPSULE(60 MG) BY MOUTH DAILY    Serotonin-Norepinephrine Reuptake Inhibitors  Passed    5/27/2018 11:48 AM       Passed - Blood pressure under 140/90 in past 12 months    BP Readings from Last 3 Encounters:   05/09/18 111/76   04/20/18 98/62   12/14/17 127/80                Passed - Recent (12 mo) or future (30 days) visit within the authorizing provider's specialty    Patient had office visit in the last 12 months or has a visit in the next 30 days with authorizing provider or within the authorizing provider's specialty.  See \"Patient Info\" tab in inbasket, or \"Choose Columns\" in Meds & Orders section of the refill encounter.           Passed - Patient is age 18 or older       Passed - No active pregnancy on record       Passed - No positive pregnancy test in past 12 months          "
6 month RX sent 4/20/18.  Janna Wild RN    
2/week(s)

## 2019-04-25 NOTE — ED ADULT NURSE NOTE - NSIMPLEMENTINTERV_GEN_ALL_ED
Implemented All Universal Safety Interventions:  [place] to call system. Call bell, personal items and telephone within reach. Instruct patient to call for assistance. Room bathroom lighting operational. Non-slip footwear when patient is off stretcher. Physically safe environment: no spills, clutter or unnecessary equipment. Stretcher in lowest position, wheels locked, appropriate side rails in place.

## 2019-04-25 NOTE — ED PROVIDER NOTE - CLINICAL SUMMARY MEDICAL DECISION MAKING FREE TEXT BOX
29 y/o M presents with headache. Blood pressure controlled by continuing present medications. Counseled on dietary modifications. Patient to follow up with PMD for further workup.
